# Patient Record
Sex: MALE | Race: WHITE | NOT HISPANIC OR LATINO | Employment: FULL TIME | URBAN - METROPOLITAN AREA
[De-identification: names, ages, dates, MRNs, and addresses within clinical notes are randomized per-mention and may not be internally consistent; named-entity substitution may affect disease eponyms.]

---

## 2022-03-20 ENCOUNTER — OFFICE VISIT (OUTPATIENT)
Dept: URGENT CARE | Facility: URGENT CARE | Age: 22
End: 2022-03-20

## 2022-03-20 VITALS
HEART RATE: 107 BPM | DIASTOLIC BLOOD PRESSURE: 82 MMHG | WEIGHT: 142 LBS | HEIGHT: 66 IN | BODY MASS INDEX: 22.82 KG/M2 | SYSTOLIC BLOOD PRESSURE: 128 MMHG | TEMPERATURE: 99.5 F | OXYGEN SATURATION: 97 %

## 2022-03-20 DIAGNOSIS — R07.0 THROAT PAIN: ICD-10-CM

## 2022-03-20 DIAGNOSIS — J36 PERITONSILLAR CELLULITIS: Primary | ICD-10-CM

## 2022-03-20 DIAGNOSIS — Z20.822 SUSPECTED COVID-19 VIRUS INFECTION: ICD-10-CM

## 2022-03-20 DIAGNOSIS — R68.89 FLU-LIKE SYMPTOMS: ICD-10-CM

## 2022-03-20 LAB
DEPRECATED S PYO AG THROAT QL EIA: NEGATIVE
FLUAV AG SPEC QL IA: NEGATIVE
FLUBV AG SPEC QL IA: NEGATIVE
GROUP A STREP BY PCR: NOT DETECTED
SARS-COV-2 RNA RESP QL NAA+PROBE: NEGATIVE

## 2022-03-20 PROCEDURE — U0003 INFECTIOUS AGENT DETECTION BY NUCLEIC ACID (DNA OR RNA); SEVERE ACUTE RESPIRATORY SYNDROME CORONAVIRUS 2 (SARS-COV-2) (CORONAVIRUS DISEASE [COVID-19]), AMPLIFIED PROBE TECHNIQUE, MAKING USE OF HIGH THROUGHPUT TECHNOLOGIES AS DESCRIBED BY CMS-2020-01-R: HCPCS | Performed by: FAMILY MEDICINE

## 2022-03-20 PROCEDURE — U0005 INFEC AGEN DETEC AMPLI PROBE: HCPCS | Performed by: FAMILY MEDICINE

## 2022-03-20 PROCEDURE — 87651 STREP A DNA AMP PROBE: CPT | Performed by: FAMILY MEDICINE

## 2022-03-20 PROCEDURE — 87804 INFLUENZA ASSAY W/OPTIC: CPT | Performed by: FAMILY MEDICINE

## 2022-03-20 PROCEDURE — 99203 OFFICE O/P NEW LOW 30 MIN: CPT | Performed by: FAMILY MEDICINE

## 2022-03-20 RX ORDER — METHYLPREDNISOLONE 4 MG
TABLET, DOSE PACK ORAL
Qty: 21 TABLET | Refills: 0 | Status: SHIPPED | OUTPATIENT
Start: 2022-03-20

## 2022-03-20 RX ORDER — CLINDAMYCIN HCL 300 MG
300 CAPSULE ORAL 3 TIMES DAILY
Qty: 30 CAPSULE | Refills: 0 | Status: SHIPPED | OUTPATIENT
Start: 2022-03-20 | End: 2022-03-30

## 2022-03-20 NOTE — PATIENT INSTRUCTIONS
Start clindamycin 3 times a day for 10 days as the pharmacist to recommend a probiotic that you can take while you are on antibiotic and continue to take it daily for total of 30 days    Start the Medrol Dosepak-this is an oral steroid- as instructed first dose this morning    If unable to swallow your saliva, voice becomes muffled, fever to ER for possible abscess in your tonsil    For pain    Start  Ibuprofen (motrin/advil)  800 mg 3 times a day always take with food for the next 2 to 3 days or until pain resolves-maximum dose of ibuprofen is 2400 mg in 24 hours     Can alternate with     Acetaminophen (Tylenol ) 1000 mg 3 times a day for the next 2  to 3 days or  until pain resolves-maximum dose of acetaminophen (tylenol)  is 3000 mg in 24-hours        Patient Education     Peritonsillar Abscess  You (or your child) has a collection of pus (abscess) around the tonsils. This abscess can cause severe sore throat, pain with swallowing, fever, drooling, and trouble opening the mouth.   The abscess is treated with antibiotics. These may be started using an IV (intravenous line), then continued by mouth. In most cases, the pus will also be drained.   Home care    Take all of the antibiotics as prescribed until they are gone. This is true even if symptoms start to get better. This is very important to ensure that the infection goes away. This infection can lead to serious complications.    Take pain medicines as directed. Don't take any over-the-counter medicine for this condition without talking with your healthcare provider, especially the first time.    To help ease pain, children older than 6 years and adults can gargle with warm saltwater 4 times a day for the first 2 days. Dissolve 1/2 teaspoon (2.46 ml) of salt in 1 glass of hot water. Gargle with the solution then spit it out. (Check that children don't swallow the saltwater.)    Cool liquids and soft foods may make eating easier for the first few days.    Don t  smoke and stay away from secondhand smoke.    Follow-up care  Follow up with a healthcare provider or as advised within 1 to 2 days.    When to seek medical advice  Call the healthcare provider right away if any of the following occur:     Fever of 100.4 F (38 C) or higher after 3 days of treatment    Symptoms that get worse    Symptoms that go away and come back    Trouble swallowing liquids or taking medicine    Neck stiffness    Bleeding a small amount from the throat    Rash    Swelling or bumps in the neck  Call 911  Call 911 if any of the following occur:     Trouble breathing, noisy breathing, or a muffled voice    Drooling    Trouble swallowing, choking, or other symptoms that may mean swelling in the throat is getting worse    Bleeding more than a small amount from the throat    [employer] last reviewed this educational content on 4/1/2020 2000-2021 [employer]. All rights reserved. This information is not intended as a substitute for professional medical care. Always follow your healthcare professional's instructions.           Patient Education     Peritonsillar Abscess  A peritonsillar abscess is a collection of pus that forms near the tonsils. It is a complication of a bacterial infection of the tonsils (tonsillitis). The abscess causes one or both tonsils to swell. The infection and swelling may spread to nearby tissues. If tissues swell enough to block the throat, the condition can become life-threatening. It is also dangerous if the abscess bursts and the infection spreads or is breathed into the lungs. The goal is to treat a peritonsillar abscess before it worsens and threatens your health.     Signs and symptoms of peritonsillar abscess     Severe sore throat (often worse on one side)    Swollen and enlarged tonsils    Fever and chills    Pain when swallowing or trouble opening the jaws of the mouth. This is also known as lockjaw or trismus.    Voice changes     Drooling    Swollen  or tender glands in the neck    Diagnosing peritonsillar abscess  Your healthcare provider will examine you and look inside your mouth and throat. You will be asked about your symptoms and health history. Tests or procedures may be done as well, including those listed below.     Throat swab. This test checks for infection. It is done by wiping a sterile cotton swab in the back of the throat. The swab can be used for an immediate result. It can also be sent to a lab for a culture if needed.    Blood tests. These might be done to check how your body is responding to the infection.    Ultrasound or CT scans. These tests provide images of the abscess. They also help rule out other problems.    Needle aspiration. This procedure removes a sample of pus from the abscess with a needle. The sample is then sent to a lab to check for infection. Whenever possible, all the pus is removed from the abscess.  Treating peritonsillar abscess  The abscess itself can be treated. Treatment of the underlying infection is also needed. Common treatments are listed below.     Medicines. Antibiotics are needed to treat the underlying infection. These may be taken by mouth or given by IV. Pain relievers may also be given, if needed.    Drainage of the abscess. A procedure may be needed to drain the pus from the abscess. Pus may be removed from the abscess with a needle (needle aspiration). Or, a small incision is made in the abscess. The pus is then drained and suctioned from the throat and mouth. This is called incision and drainage.    Tonsillectomy. This is surgery to remove the tonsils. It may be done if the abscess does not improve with medicines. It may also be done if you have frequent tonsil infections or abscesses.  Recovery and follow-up  Treating the bacterial infection generally relieves the problem. Once the infection goes away, you should recover completely. Follow up with your healthcare provider as directed. And if you develop  another throat infection, see your healthcare provider right away.   [employer] last reviewed this educational content on 2/1/2020 2000-2021 [employer]. All rights reserved. This information is not intended as a substitute for professional medical care. Always follow your healthcare professional's instructions.           Patient Education     Peritonsillar Infection    You (or your child) has an infection around the tonsils. . The infection can cause severe sore throat, pain with swallowing, swollen glands, and fever.     Home care    All of the antibiotics should be taken as prescribed until they are gone. This is true even if symptoms start to get better. This is very important to ensure that the infection goes away. This helps prevent serious complications. It also helps keep the infection from spreading to other people.    Pain medicines should be taken as directed. (Don't give aspirin or aspirin-containing medicines to children younger than 18 years. It can cause a serious problem called Reye syndrome.)    To help ease pain, children older than 6 years and adults can gargle with warm saltwater. This can be done 4 times a day for the first 2 days. Dissolve 1/2 teaspoon of salt in 1 glass of warm water. Gargle with the solution, then spit it out. (Be sure that children don't swallow the saltwater.) Discuss this home care solution with the healthcare provider to find out what solution is best for you (or your child).    Cool liquids and soft foods may make eating easier for the first few days.    Follow-up care  Follow up with a healthcare provider, or as advised.   When to seek medical advice  Call the healthcare provider right away if any of the following occur:     Fever (see Fever and children, below)    Symptoms that get worse or new symptoms     Symptoms that go away and come back    Refusing food and drink    Trouble opening the mouth    Bleeding    Rash    Swelling or enlarged glands (look  like bumps) in the neck    Neck stiffness  Call 911  Call 911 right away if any of the following occur:     Trouble swallowing    Inability to eat or drink    Trouble breathing    Excessive drooling  Prevention  Here are steps you can take to help prevent an infection:     Keep good hand washing habits.    Don t have close contact with people who have sore throats, colds, or other upper respiratory infections.    Don t smoke, and stay away from secondhand smoke.    Stay up-to-date with of your vaccines.  Fever and children   Use a digital thermometer to check your child s temperature. Don t use a mercury thermometer. There are different kinds and uses of digital thermometers. They include:     Rectal. For children younger than 3 years, a rectal temperature is the most accurate.    Forehead (temporal). This works for children age 3 months and older. If a child under 3 months old has signs of illness, this can be used for a first pass. The provider may want to confirm with a rectal temperature.    Ear (tympanic). Ear temperatures are accurate after 6 months of age, but not before.    Armpit (axillary). This is the least reliable but may be used for a first pass to check a child of any age with signs of illness. The provider may want to confirm with a rectal temperature.    Mouth (oral). Don t use a thermometer in your child s mouth until he or she is at least 4 years old.  Use the rectal thermometer with care. Follow the product maker s directions for correct use. Insert it gently. Label it and make sure it s not used in the mouth. It may pass on germs from the stool. If you don t feel OK using a rectal thermometer, ask the healthcare provider what type to use instead. When you talk with any healthcare provider about your child s fever, tell him or her which type you used.   Below are guidelines to know if your young child has a fever. Your child s healthcare provider may give you different numbers for your child.  Follow your provider s specific instructions.   Fever readings for a baby under 3 months old:     First, ask your child s healthcare provider how you should take the temperature.    Rectal or forehead: 100.4 F (38 C) or higher    Armpit: 99 F (37.2 C) or higher  Fever readings for a child age 3 months to 36 months (3 years):     Rectal, forehead, or ear: 102 F (38.9 C) or higher    Armpit: 101 F (38.3 C) or higher  Call the healthcare provider in these cases:     Repeated temperature of 104 F (40 C) or higher in a child of any age    Fever of 100.4  F (38  C) or higher in baby younger than 3 months    Fever that lasts more than 24 hours in a child under age 2    Fever that lasts for 3 days in a child age 2 or older  [employer] last reviewed this educational content on 12/1/2019 2000-2021 [employer]. All rights reserved. This information is not intended as a substitute for professional medical care. Always follow your healthcare professional's instructions.

## 2022-03-20 NOTE — LETTER
[facility] [address] [place] [address]  Phone: [phone]    March 20, 2022        Alfie Salazar  [facility] [address] [place] [place] [address]          To whom it may concern:    RE: Alfie Salazar    Patient was seen and treated today at our clinic.  Please excuse from work starting today 3/20/2022 and he can return to work on 3/23/2022.      Please contact me for questions or concerns.      Sincerely,        Cori Dominguez MD

## 2022-03-20 NOTE — PROGRESS NOTES
"  Patient presents with:  Urgent Care: Pt in clinic to have eval for throat discomfort.  Throat Problem       Subjective     Alfie Salazar is a 21 year old male who presents to clinic today for the following health issues:    HPI     URI Adult    Onset of symptoms was today  Course of illness is worsening.    Severity moderate  Current and Associated symptoms: stuffy nose, sore throat, body aches, fatigue and painful to swallow, not sleeping well due to pain   Denies fever, chills, cough - non-productive, wheezing, shortness of breath, ear pain bilateral, ear drainage bilateral, eye drainage, facial pain/pressure, headache, nausea, vomiting and diarrhea  Treatment measures tried include Fluids and Rest.  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  Social History     Tobacco Use     Smoking status: Current Some Day Smoker     Smokeless tobacco: Never Used   Substance Use Topics     Alcohol use: Not on file       Current Outpatient Medications   Medication Sig Dispense Refill     clindamycin (CLEOCIN) 300 MG capsule Take 1 capsule (300 mg) by mouth 3 times daily for 10 days 30 capsule 0     methylPREDNISolone (MEDROL DOSEPAK) 4 MG tablet therapy pack Follow Package Directions 21 tablet 0     No Known Allergies          ROS are negative, except as otherwise noted HPI      Objective    /82   Pulse 107   Temp 99.5  F (37.5  C) (Temporal)   Ht 1.676 m (5' 6\")   Wt 64.4 kg (142 lb)   SpO2 97%   BMI 22.92 kg/m    Body mass index is 22.92 kg/m .  Physical Exam   GENERAL: fatigued, interactive.  Alert and oriented x 3.  No acute distress.   HEENT: Diffuse pharyngeal erythema. Tonsils enlarged, right slightly larger that right, uvular intermittent points to right, uvula midline after swallowing.  Sclera, lids and conjunctiva are normal.  Nose and ears clear.  NECK: shoddy anterior " chain adenopathy, tender submandibular glands R> L   CHEST:  clear, no wheezing or rales. Normal symmetric air entry throughout both lung fields.  HEART:  S1 and S2 normal, no murmurs, clicks, gallops or rubs. Regular rate and rhythm.  SKIN:  No rashes        Diagnostic Test Results:  Labs reviewed in Epic  Results for orders placed or performed in visit on 03/20/22   Streptococcus A Rapid Screen w/Reflex to PCR - Clinic Collect     Status: Normal    Specimen: Throat; Swab   Result Value Ref Range    Group A Strep antigen Negative Negative   Group A Streptococcus PCR Throat Swab     Status: Normal    Specimen: Throat; Swab   Influenza A & B Antigen - Clinic Collect     Status: Normal    Specimen: Nasopharyngeal; Swab   Result Value Ref Range    Influenza A antigen Negative Negative    Influenza B antigen Negative Negative    Narrative    Test results must be correlated with clinical data. If necessary, results should be confirmed by a molecular assay or viral culture.           ASSESSMENT/PLAN:      ICD-10-CM    1. Peritonsillar cellulitis  J36 methylPREDNISolone (MEDROL DOSEPAK) 4 MG tablet therapy pack     clindamycin (CLEOCIN) 300 MG capsule   2. Suspected COVID-19 virus infection  Z20.822 Symptomatic; Yes; 3/20/2022 COVID-19 Virus (Coronavirus) by PCR Nose   3. Flu-like symptoms  R68.89 Influenza A & B Antigen - Clinic Collect   4. Throat pain  R07.0 Streptococcus A Rapid Screen w/Reflex to PCR - Clinic Collect     Group A Streptococcus PCR Throat Swab             Reviewed medication instructions and side effects. Follow up if experiences side effects.     I reviewed supportive care, otc meds to use if needed, expected course, and signs of concern.  Follow up as needed or if he does not improve within  1-2 days or if worsens in any way.  Reviewed red flag symptoms and is to go to the ER if experiences any of these.     The use of Dragon/[employer] services may have been used to construct the content in  this note; any grammatical or spelling errors are non-intentional. Please contact the author of this note directly if you are in need of any clarification.      On the day of the encounter, time spend on chart review, patient visit, review of testing, documentation was 30 minutes          Patient Instructions   Start clindamycin 3 times a day for 10 days as the pharmacist to recommend a probiotic that you can take while you are on antibiotic and continue to take it daily for total of 30 days    Start the Medrol Dosepak-this is an oral steroid- as instructed first dose this morning    If unable to swallow your saliva, voice becomes muffled, fever to ER for possible abscess in your tonsil    For pain    Start  Ibuprofen (motrin/advil)  800 mg 3 times a day always take with food for the next 2 to 3 days or until pain resolves-maximum dose of ibuprofen is 2400 mg in 24 hours     Can alternate with     Acetaminophen (Tylenol ) 1000 mg 3 times a day for the next 2  to 3 days or  until pain resolves-maximum dose of acetaminophen (tylenol)  is 3000 mg in 24-hours        Patient Education     Peritonsillar Abscess  You (or your child) has a collection of pus (abscess) around the tonsils. This abscess can cause severe sore throat, pain with swallowing, fever, drooling, and trouble opening the mouth.   The abscess is treated with antibiotics. These may be started using an IV (intravenous line), then continued by mouth. In most cases, the pus will also be drained.   Home care    Take all of the antibiotics as prescribed until they are gone. This is true even if symptoms start to get better. This is very important to ensure that the infection goes away. This infection can lead to serious complications.    Take pain medicines as directed. Don't take any over-the-counter medicine for this condition without talking with your healthcare provider, especially the first time.    To help ease pain, children older than 6 years and adults  can gargle with warm saltwater 4 times a day for the first 2 days. Dissolve 1/2 teaspoon (2.46 ml) of salt in 1 glass of hot water. Gargle with the solution then spit it out. (Check that children don't swallow the saltwater.)    Cool liquids and soft foods may make eating easier for the first few days.    Don t smoke and stay away from secondhand smoke.    Follow-up care  Follow up with a healthcare provider or as advised within 1 to 2 days.    When to seek medical advice  Call the healthcare provider right away if any of the following occur:     Fever of 100.4 F (38 C) or higher after 3 days of treatment    Symptoms that get worse    Symptoms that go away and come back    Trouble swallowing liquids or taking medicine    Neck stiffness    Bleeding a small amount from the throat    Rash    Swelling or bumps in the neck  Call 911  Call 911 if any of the following occur:     Trouble breathing, noisy breathing, or a muffled voice    Drooling    Trouble swallowing, choking, or other symptoms that may mean swelling in the throat is getting worse    Bleeding more than a small amount from the throat    [employer] last reviewed this educational content on 4/1/2020 2000-2021 [employer]. All rights reserved. This information is not intended as a substitute for professional medical care. Always follow your healthcare professional's instructions.           Patient Education     Peritonsillar Abscess  A peritonsillar abscess is a collection of pus that forms near the tonsils. It is a complication of a bacterial infection of the tonsils (tonsillitis). The abscess causes one or both tonsils to swell. The infection and swelling may spread to nearby tissues. If tissues swell enough to block the throat, the condition can become life-threatening. It is also dangerous if the abscess bursts and the infection spreads or is breathed into the lungs. The goal is to treat a peritonsillar abscess before it worsens and threatens  your health.     Signs and symptoms of peritonsillar abscess     Severe sore throat (often worse on one side)    Swollen and enlarged tonsils    Fever and chills    Pain when swallowing or trouble opening the jaws of the mouth. This is also known as lockjaw or trismus.    Voice changes     Drooling    Swollen or tender glands in the neck    Diagnosing peritonsillar abscess  Your healthcare provider will examine you and look inside your mouth and throat. You will be asked about your symptoms and health history. Tests or procedures may be done as well, including those listed below.     Throat swab. This test checks for infection. It is done by wiping a sterile cotton swab in the back of the throat. The swab can be used for an immediate result. It can also be sent to a lab for a culture if needed.    Blood tests. These might be done to check how your body is responding to the infection.    Ultrasound or CT scans. These tests provide images of the abscess. They also help rule out other problems.    Needle aspiration. This procedure removes a sample of pus from the abscess with a needle. The sample is then sent to a lab to check for infection. Whenever possible, all the pus is removed from the abscess.  Treating peritonsillar abscess  The abscess itself can be treated. Treatment of the underlying infection is also needed. Common treatments are listed below.     Medicines. Antibiotics are needed to treat the underlying infection. These may be taken by mouth or given by IV. Pain relievers may also be given, if needed.    Drainage of the abscess. A procedure may be needed to drain the pus from the abscess. Pus may be removed from the abscess with a needle (needle aspiration). Or, a small incision is made in the abscess. The pus is then drained and suctioned from the throat and mouth. This is called incision and drainage.    Tonsillectomy. This is surgery to remove the tonsils. It may be done if the abscess does not improve  with medicines. It may also be done if you have frequent tonsil infections or abscesses.  Recovery and follow-up  Treating the bacterial infection generally relieves the problem. Once the infection goes away, you should recover completely. Follow up with your healthcare provider as directed. And if you develop another throat infection, see your healthcare provider right away.   [employer] last reviewed this educational content on 2/1/2020 2000-2021 [employer]. All rights reserved. This information is not intended as a substitute for professional medical care. Always follow your healthcare professional's instructions.           Patient Education     Peritonsillar Infection    You (or your child) has an infection around the tonsils. . The infection can cause severe sore throat, pain with swallowing, swollen glands, and fever.     Home care    All of the antibiotics should be taken as prescribed until they are gone. This is true even if symptoms start to get better. This is very important to ensure that the infection goes away. This helps prevent serious complications. It also helps keep the infection from spreading to other people.    Pain medicines should be taken as directed. (Don't give aspirin or aspirin-containing medicines to children younger than 18 years. It can cause a serious problem called Reye syndrome.)    To help ease pain, children older than 6 years and adults can gargle with warm saltwater. This can be done 4 times a day for the first 2 days. Dissolve 1/2 teaspoon of salt in 1 glass of warm water. Gargle with the solution, then spit it out. (Be sure that children don't swallow the saltwater.) Discuss this home care solution with the healthcare provider to find out what solution is best for you (or your child).    Cool liquids and soft foods may make eating easier for the first few days.    Follow-up care  Follow up with a healthcare provider, or as advised.   When to seek medical  advice  Call the healthcare provider right away if any of the following occur:     Fever (see Fever and children, below)    Symptoms that get worse or new symptoms     Symptoms that go away and come back    Refusing food and drink    Trouble opening the mouth    Bleeding    Rash    Swelling or enlarged glands (look like bumps) in the neck    Neck stiffness  Call 911  Call 911 right away if any of the following occur:     Trouble swallowing    Inability to eat or drink    Trouble breathing    Excessive drooling  Prevention  Here are steps you can take to help prevent an infection:     Keep good hand washing habits.    Don t have close contact with people who have sore throats, colds, or other upper respiratory infections.    Don t smoke, and stay away from secondhand smoke.    Stay up-to-date with of your vaccines.  Fever and children   Use a digital thermometer to check your child s temperature. Don t use a mercury thermometer. There are different kinds and uses of digital thermometers. They include:     Rectal. For children younger than 3 years, a rectal temperature is the most accurate.    Forehead (temporal). This works for children age 3 months and older. If a child under 3 months old has signs of illness, this can be used for a first pass. The provider may want to confirm with a rectal temperature.    Ear (tympanic). Ear temperatures are accurate after 6 months of age, but not before.    Armpit (axillary). This is the least reliable but may be used for a first pass to check a child of any age with signs of illness. The provider may want to confirm with a rectal temperature.    Mouth (oral). Don t use a thermometer in your child s mouth until he or she is at least 4 years old.  Use the rectal thermometer with care. Follow the product maker s directions for correct use. Insert it gently. Label it and make sure it s not used in the mouth. It may pass on germs from the stool. If you don t feel OK using a rectal  thermometer, ask the healthcare provider what type to use instead. When you talk with any healthcare provider about your child s fever, tell him or her which type you used.   Below are guidelines to know if your young child has a fever. Your child s healthcare provider may give you different numbers for your child. Follow your provider s specific instructions.   Fever readings for a baby under 3 months old:     First, ask your child s healthcare provider how you should take the temperature.    Rectal or forehead: 100.4 F (38 C) or higher    Armpit: 99 F (37.2 C) or higher  Fever readings for a child age 3 months to 36 months (3 years):     Rectal, forehead, or ear: 102 F (38.9 C) or higher    Armpit: 101 F (38.3 C) or higher  Call the healthcare provider in these cases:     Repeated temperature of 104 F (40 C) or higher in a child of any age    Fever of 100.4  F (38  C) or higher in baby younger than 3 months    Fever that lasts more than 24 hours in a child under age 2    Fever that lasts for 3 days in a child age 2 or older  Kira Talent last reviewed this educational content on 12/1/2019 2000-2021 [employer]. All rights reserved. This information is not intended as a substitute for professional medical care. Always follow your healthcare professional's instructions.

## 2022-03-21 NOTE — RESULT ENCOUNTER NOTE
Please call and  inform patient that lab results are negative -strep and influenza     Please feel free to contact us with any questions or if you would   like more information.